# Patient Record
Sex: MALE | Race: OTHER
[De-identification: names, ages, dates, MRNs, and addresses within clinical notes are randomized per-mention and may not be internally consistent; named-entity substitution may affect disease eponyms.]

---

## 2021-03-09 ENCOUNTER — HOSPITAL ENCOUNTER (EMERGENCY)
Dept: HOSPITAL 72 - EMR | Age: 26
Discharge: LEFT BEFORE DISCHARGE | End: 2021-03-09
Payer: SELF-PAY

## 2021-03-09 VITALS — HEIGHT: 69 IN | BODY MASS INDEX: 26.66 KG/M2 | WEIGHT: 180 LBS

## 2021-03-09 VITALS — DIASTOLIC BLOOD PRESSURE: 78 MMHG | SYSTOLIC BLOOD PRESSURE: 133 MMHG

## 2021-03-09 DIAGNOSIS — K62.89: Primary | ICD-10-CM

## 2021-03-09 LAB
ADD MANUAL DIFF: NO
ALBUMIN SERPL-MCNC: 4.8 G/DL (ref 3.4–5)
ALBUMIN/GLOB SERPL: 1.3 {RATIO} (ref 1–2.7)
ALP SERPL-CCNC: 97 U/L (ref 46–116)
ALT SERPL-CCNC: 153 U/L (ref 12–78)
ANION GAP SERPL CALC-SCNC: 9 MMOL/L (ref 5–15)
APPEARANCE UR: CLEAR
APTT BLD: 25 SEC (ref 23–33)
APTT PPP: YELLOW S
AST SERPL-CCNC: 46 U/L (ref 15–37)
BASOPHILS NFR BLD AUTO: 1.6 % (ref 0–2)
BILIRUB SERPL-MCNC: 0.7 MG/DL (ref 0.2–1)
BUN SERPL-MCNC: 11 MG/DL (ref 7–18)
CALCIUM SERPL-MCNC: 9.4 MG/DL (ref 8.5–10.1)
CHLORIDE SERPL-SCNC: 105 MMOL/L (ref 98–107)
CO2 SERPL-SCNC: 28 MMOL/L (ref 21–32)
CREAT SERPL-MCNC: 0.9 MG/DL (ref 0.55–1.3)
EOSINOPHIL NFR BLD AUTO: 1.6 % (ref 0–3)
ERYTHROCYTE [DISTWIDTH] IN BLOOD BY AUTOMATED COUNT: 12.6 % (ref 11.6–14.8)
GLOBULIN SER-MCNC: 3.6 G/DL
GLUCOSE UR STRIP-MCNC: NEGATIVE MG/DL
HCT VFR BLD CALC: 54.6 % (ref 42–52)
HGB BLD-MCNC: 17.5 G/DL (ref 14.2–18)
INR PPP: 1 (ref 0.9–1.1)
KETONES UR QL STRIP: NEGATIVE
LEUKOCYTE ESTERASE UR QL STRIP: NEGATIVE
LYMPHOCYTES NFR BLD AUTO: 24.5 % (ref 20–45)
MCV RBC AUTO: 92 FL (ref 80–99)
MONOCYTES NFR BLD AUTO: 5.7 % (ref 1–10)
NEUTROPHILS NFR BLD AUTO: 66.7 % (ref 45–75)
NITRITE UR QL STRIP: NEGATIVE
PH UR STRIP: 5 [PH] (ref 4.5–8)
PLATELET # BLD: 324 K/UL (ref 150–450)
POTASSIUM SERPL-SCNC: 3.4 MMOL/L (ref 3.5–5.1)
PROT UR QL STRIP: NEGATIVE
RBC # BLD AUTO: 5.96 M/UL (ref 4.7–6.1)
SODIUM SERPL-SCNC: 142 MMOL/L (ref 136–145)
SP GR UR STRIP: 1.02 (ref 1–1.03)
UROBILINOGEN UR-MCNC: NORMAL MG/DL (ref 0–1)
WBC # BLD AUTO: 8.9 K/UL (ref 4.8–10.8)

## 2021-03-09 PROCEDURE — 85730 THROMBOPLASTIN TIME PARTIAL: CPT

## 2021-03-09 PROCEDURE — 96374 THER/PROPH/DIAG INJ IV PUSH: CPT

## 2021-03-09 PROCEDURE — 85610 PROTHROMBIN TIME: CPT

## 2021-03-09 PROCEDURE — 86900 BLOOD TYPING SEROLOGIC ABO: CPT

## 2021-03-09 PROCEDURE — 99284 EMERGENCY DEPT VISIT MOD MDM: CPT

## 2021-03-09 PROCEDURE — 81003 URINALYSIS AUTO W/O SCOPE: CPT

## 2021-03-09 PROCEDURE — 80053 COMPREHEN METABOLIC PANEL: CPT

## 2021-03-09 PROCEDURE — 74177 CT ABD & PELVIS W/CONTRAST: CPT

## 2021-03-09 PROCEDURE — 86901 BLOOD TYPING SEROLOGIC RH(D): CPT

## 2021-03-09 PROCEDURE — 36415 COLL VENOUS BLD VENIPUNCTURE: CPT

## 2021-03-09 PROCEDURE — 74018 RADEX ABDOMEN 1 VIEW: CPT

## 2021-03-09 PROCEDURE — 82270 OCCULT BLOOD FECES: CPT

## 2021-03-09 PROCEDURE — 85025 COMPLETE CBC W/AUTO DIFF WBC: CPT

## 2021-03-09 PROCEDURE — 83690 ASSAY OF LIPASE: CPT

## 2021-03-09 PROCEDURE — 96361 HYDRATE IV INFUSION ADD-ON: CPT

## 2021-03-09 PROCEDURE — 86850 RBC ANTIBODY SCREEN: CPT

## 2021-03-09 NOTE — NUR
Patient reports to the ER c/o blood in his stool x 1-2 years. He has no other 
significant medical issues. He also reports that he only came in today because 
he had abdominal pain and diarrhea which started yesterday. No diarrhea noted 
as of present. No blood/stool of significance for hemocult testing.

Patient also requested for his sperm to be tested to find out if he can have 
children.

## 2021-03-09 NOTE — DIAGNOSTIC IMAGING REPORT
Indication: Abdominal pain

 

Technique: CT of the abdomen and pelvis utilizing automated exposure control with

intravenous contrast. Venous scanning performed. Axial, sagittal and coronal

reformats presented.

 

CT dose: Total .9 mGycm; CTDI vol 7.4 mGy

 

Comparison: None

 

Findings: 

Minimal dependent atelectatic changes noted in the lung bases. Partially imaged are

normal in size. No pericardial effusion.

 

Hypoattenuation of the liver relative to the spleen suggesting but not diagnostic of

mild steatosis. Two subcentimeter well-circumscribed low-attenuation lesions noted in

the hepatic dome, to small to fully characterize but potentially small simple cysts

versus biliary hamartomas. Hepatic and portal veins are patent. No CT evident

gallstones or discrete pericholecystic inflammatory changes. No biliary ductal

dilatation.

 

Spleen, adrenal glands and pancreas unremarkable. No discrete peripancreatic

inflammatory changes or fluid collections. Pancreatic enhancement appears

homogeneous.

 

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 The kidneys enhance symmetrically. There is no urinary

tract stone, hydronephrosis or perinephric stranding. Suggestion of a duplex

collecting system on the right. There is bladder wall thickening which may be related

to under distention versus cystitis. Prostate is normal in size. There are some small

central prosthetic calcifications.

 

There is no fringe peritoneal air or fluid. There is no evidence of small bowel

obstruction. Appendix is normal in caliber and there are no periappendiceal

inflammatory changes. There is thickening of the wall of the distal sigmoid colon and

rectum suggesting a mild focal colitis/proctitis. No significant colonic diverticular

seen. No pericolonic inflammatory stranding.

 

Abdominal aorta is normal in caliber. No pathologically enlarged lymphadenopathy. No

acute osseous abnormality.

 

IMPRESSION: 

*  Thickening of the wall of the distal sigmoid and rectum suggesting a mild focal

colitis/proctitis. Etiology is uncertain but potentially infectious or inflammatory.

Recommend follow-up colonoscopy/sigmoidoscopy to exclude neoplastic thickening.

*  No evidence of bowel obstruction. Normal appendix.

*  Bladder wall thickening which may be related to under distention versus cystitis.

Correlation with urinalysis recommended.

 

The CT scanner at [place] [facility] is accredited by the [employer]

[employer]iology and the scans are performed using protocols designed to limit radiation

exposure to as low as reasonably achievable to attain images of sufficient resolution

adequate for diagnostic evaluation.

## 2021-03-09 NOTE — EMERGENCY ROOM REPORT
History of Present Illness


General


Chief Complaint:  General Complaint


Source:  Patient





Present Illness


HPI


25-year-old male presents for evaluation.  Notes blood in the stool for the last

2 days.  Bright red blood.  Mild abdominal pain.  5 out of 10, dull, 

nonradiating.  States he has been told in the past he has gastritis.  Denies 

taking blood thinners.  Denies being constipated.  No other aggravating relievi

ng factors.  Denies any other associated symptoms


Allergies:  


Coded Allergies:  


     No Known Allergies (Unverified , 3/9/21)





COVID-19 Screening


Contact w/high risk pt:  No


Experienced COVID-19 symptoms?:  No


COVID-19 Testing performed PTA:  No





Patient History


Past Medical History:  none


Past Surgical History:  none


Pertinent Family History:  none


Social History:  Denies: smoking, alcohol use, drug use


Immunizations:  UTD


Reviewed Nursing Documentation:  PMH: Agreed; PSxH: Agreed





Nursing Documentation-PM


Past Medical History:  No Stated History





Review of Systems


All Other Systems:  negative except mentioned in HPI





Physical Exam





Vital Signs








  Date Time  Temp Pulse Resp B/P (MAP) Pulse Ox O2 Delivery O2 Flow Rate FiO2


 


3/9/21 07:47 97.0 68 18 133/78 (96) 98 Room Air  








Sp02 EP Interpretation:  reviewed, normal


General Appearance:  no apparent distress, alert, GCS 15, non-toxic


Head:  normocephalic, atraumatic


Eyes:  bilateral eye normal inspection, bilateral eye PERRL


ENT:  hearing grossly normal, normal pharynx, no angioedema, normal voice


Neck:  full range of motion, supple/symm/no masses


Respiratory:  chest non-tender, lungs clear, normal breath sounds, speaking full

sentences


Cardiovascular #1:  regular rate, rhythm, no edema


Cardiovascular #2:  2+ carotid (R), 2+ carotid (L), 2+ radial (R), 2+ radial 

(L), 2+ dorsalis pedis (R), 2+ dorsalis pedis (L)


Gastrointestinal:  normal bowel sounds, soft, no guarding, no rebound, 

tenderness


Rectal:  heme positive stool


Genitourinary:  normal inspection, no CVA tenderness


Musculoskeletal:  back normal, normal range of motion, gait/station normal, non-

tender


Neurologic:  alert, motor strength/tone normal, oriented x3, sensory intact, 

responsive, speech normal


Psychiatric:  judgement/insight normal, memory normal, mood/affect normal, no 

suicidal/homicidal ideation


Reflexes:  3+ bicep (R), 3+ bicep (L), 3+ tricep (R), 3+ tricep (L), 3+ knee 

(R), 3+ knee (L)


Skin:  no rash


Lymphatic:  no adenopathy





Medical Decision Making


Diagnostic Impression:  


   Primary Impression:  


   Proctitis


ER Course


Hospital Course 


25-year-old male presents with rectal bleeding, abdominal pain





Differential diagnoses include: BPH, cystitis, pyelonephritis, kidney stone 





Clinical course


Patient placed on stretcher.  Cardiac monitor.  After initial history and 

physical I ordered labs, IV fluids, UA, pain medication and CT scan 





Labs - no leukocytosis, Hb/Hct stable, electrolytes okay 


CT abdomen and pelvis -proctitis/colitis





discussed findings with patient.  I believe patient should be admitted.  States 

he does not want to stay.  States he has had this problem on and off for years. 

Patient states he wishes to go home.  Understands the risks of leaving.  Patient

has competency to make his own decisions.  Signed AMA form.





Given copy of CT report.  Given Cipro and Flagyl in ED.  Given PMD referrals





I feel this is a highly complex case requiring extensive working including 

EKG/Rhythm strip, Xray/CT/US, Blood/urine lab work, repeat exams while in ED, 

and administration of strong opiates/narcotics for pain control, admission to 

hospital or close patient follow up.  





Diagnosis - proctitis





patient left AMA





Laboratory Tests








Test


 3/9/21


08:00


 


White Blood Count


 8.9 K/UL


(4.8-10.8)


 


Red Blood Count


 5.96 M/UL


(4.70-6.10)


 


Hemoglobin


 17.5 G/DL


(14.2-18.0)


 


Hematocrit


 54.6 %


(42.0-52.0)  H


 


Mean Corpuscular Volume 92 FL (80-99)  


 


Mean Corpuscular Hemoglobin


 29.4 PG


(27.0-31.0)


 


Mean Corpuscular Hemoglobin


Concent 32.1 G/DL


(32.0-36.0)


 


Red Cell Distribution Width


 12.6 %


(11.6-14.8)


 


Platelet Count


 324 K/UL


(150-450)


 


Mean Platelet Volume


 8.0 FL


(6.5-10.1)


 


Neutrophils (%) (Auto)


 66.7 %


(45.0-75.0)


 


Lymphocytes (%) (Auto)


 24.5 %


(20.0-45.0)


 


Monocytes (%) (Auto)


 5.7 %


(1.0-10.0)


 


Eosinophils (%) (Auto)


 1.6 %


(0.0-3.0)


 


Basophils (%) (Auto)


 1.6 %


(0.0-2.0)


 


Prothrombin Time


 11.1 SEC


(9.30-11.50)


 


Prothromb Time International


Ratio 1.0 (0.9-1.1)  





 


Activated Partial


Thromboplast Time 25 SEC (23-33)





 


Urine Color Yellow  


 


Urine Appearance Clear  


 


Urine pH 5 (4.5-8.0)  


 


Urine Specific Gravity


 1.025


(1.005-1.035)


 


Urine Protein


 Negative


(NEGATIVE)


 


Urine Glucose (UA)


 Negative


(NEGATIVE)


 


Urine Ketones


 Negative


(NEGATIVE)


 


Urine Blood


 2+ (NEGATIVE)


H


 


Urine Nitrite


 Negative


(NEGATIVE)


 


Urine Bilirubin


 Negative


(NEGATIVE)


 


Urine Urobilinogen


 Normal MG/DL


(0.0-1.0)


 


Urine Leukocyte Esterase


 Negative


(NEGATIVE)


 


Urine RBC


 0-2 /HPF (0 -


0)  H


 


Urine WBC


 0-2 /HPF (0 -


0)


 


Urine Squamous Epithelial


Cells Occasional


/LPF


 


Urine Bacteria


 Few /HPF


(NONE)


 


Stool Occult Blood


 Negative


(NEGATIVE)


 


Sodium Level


 142 MMOL/L


(136-145)


 


Potassium Level


 3.4 MMOL/L


(3.5-5.1)  L


 


Chloride Level


 105 MMOL/L


()


 


Carbon Dioxide Level


 28 MMOL/L


(21-32)


 


Anion Gap


 9 mmol/L


(5-15)


 


Blood Urea Nitrogen


 11 mg/dL


(7-18)


 


Creatinine


 0.9 MG/DL


(0.55-1.30)


 


Estimat Glomerular Filtration


Rate > 60 mL/min


(>60)


 


Glucose Level


 99 MG/DL


()


 


Calcium Level


 9.4 MG/DL


(8.5-10.1)


 


Total Bilirubin


 0.7 MG/DL


(0.2-1.0)


 


Aspartate Amino Transf


(AST/SGOT) 46 U/L (15-37)


H


 


Alanine Aminotransferase


(ALT/SGPT) 153 U/L


(12-78)  H


 


Alkaline Phosphatase


 97 U/L


()


 


Total Protein


 8.4 G/DL


(6.4-8.2)  H


 


Albumin


 4.8 G/DL


(3.4-5.0)


 


Globulin 3.6 g/dL  


 


Albumin/Globulin Ratio 1.3 (1.0-2.7)  


 


Lipase


 113 U/L


()








CT/MRI/US Diagnostic Results


CT/MRI/US Diagnostic Results :  


   Imaging Test Ordered:  CT A/P


   Impression


Procedure: CT Abdomen Pelvis w/Contrast


Indication: Abdominal pain


 


Technique: CT of the abdomen and pelvis utilizing automated exposure control 

with


intravenous contrast. Venous scanning performed. Axial, sagittal and coronal


reformats presented.


 


CT dose: Total .9 mGycm; CTDI vol 7.4 mGy


 


Comparison: None


 


Findings: 


Minimal dependent atelectatic changes noted in the lung bases. Partially imaged 

are


normal in size. No pericardial effusion.


 


Hypoattenuation of the liver relative to the spleen suggesting but not 

diagnostic of


mild steatosis. Two subcentimeter well-circumscribed low-attenuation lesions 

noted in


the hepatic dome, to small to fully characterize but potentially small simple 

cysts


versus biliary hamartomas. Hepatic and portal veins are patent. No CT evident


gallstones or discrete pericholecystic inflammatory changes. No biliary ductal


dilatation.


 


Spleen, adrenal glands and pancreas unremarkable. No discrete peripancreatic


inflammatory changes or fluid collections. Pancreatic enhancement appears


homogeneous.


 


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 The kidneys enhance symmetrically. There is no 

urinary


tract stone, hydronephrosis or perinephric stranding. Suggestion of a duplex


collecting system on the right. There is bladder wall thickening which may be 

related


to under distention versus cystitis. Prostate is normal in size. There are some 

small


central prosthetic calcifications.


 


There is no fringe peritoneal air or fluid. There is no evidence of small bowel


obstruction. Appendix is normal in caliber and there are no periappendiceal


inflammatory changes. There is thickening of the wall of the distal sigmoid 

colon and


rectum suggesting a mild focal colitis/proctitis. No significant colonic 

diverticular


seen. No pericolonic inflammatory stranding.


 


Abdominal aorta is normal in caliber. No pathologically enlarged 

lymphadenopathy. No


acute osseous abnormality.


 


IMPRESSION: 


*  Thickening of the wall of the distal sigmoid and rectum suggesting a mild 

focal


colitis/proctitis. Etiology is uncertain but potentially infectious or 

inflammatory.


Recommend follow-up colonoscopy/sigmoidoscopy to exclude neoplastic thickening.


*  No evidence of bowel obstruction. Normal appendix.


*  Bladder wall thickening which may be related to under distention versus 

cystitis.


Correlation with urinalysis recommended.


 


The CT scanner at [facility] is accredited by the American College 

of


Radiology and the scans are performed using protocols designed to limit 

radiation


exposure to as low as reasonably achievable to attain images of sufficient 

resolution


adequate for diagnostic evaluation.





Last Vital Signs








  Date Time  Temp Pulse Resp B/P (MAP) Pulse Ox O2 Delivery O2 Flow Rate FiO2


 


3/9/21 08:09      Room Air  


 


3/9/21 07:47 97.0 68 18 133/78 (96) 98   








Status:  improved


Disposition:  AGAINST MEDICAL ADVICE


Condition:  Stable


Scripts


Pantoprazole* (PROTONIX*) 40 Mg Tablet.


40 MG ORAL DAILY, #30 TAB


   Prov: Eliel Mcmanus MD         3/9/21 


Metronidazole* (FLAGYL*) 500 Mg Tablet


500 MG ORAL THREE TIMES A DAY, #21 TAB


   Prov: Eliel Mcmanus MD         3/9/21 


Ciprofloxacin Hcl* (CIPROFLOXACIN HCL*) 500 Mg Tablet


500 MG ORAL Q12H, #14 TAB 0 Refills


   Prov: Eliel Mcmanus MD         3/9/21


Referrals:  


NOT CHOSEN [facility]/MD,REFERRING (PCP)











Eliel Mcmanus MD             Mar 9, 2021 09:19

## 2021-03-09 NOTE — DIAGNOSTIC IMAGING REPORT
Indication: Abdominal pain

 

Technique: XRAY Abdomen 1v

 

Comparison: None

 

Findings: Noncontrasted bowel gas pattern. No evidence to suggest free

intraperitoneal air however evaluation is limited without inclusion of a

standing/erect view. No acute osseous abnormality. No radiopaque foreign body.

 

Impression: 

Nonobstructive bowel gas pattern.